# Patient Record
Sex: MALE | Race: WHITE | URBAN - METROPOLITAN AREA
[De-identification: names, ages, dates, MRNs, and addresses within clinical notes are randomized per-mention and may not be internally consistent; named-entity substitution may affect disease eponyms.]

---

## 2022-04-11 ENCOUNTER — OFFICE VISIT (OUTPATIENT)
Dept: URBAN - METROPOLITAN AREA CLINIC 80 | Facility: CLINIC | Age: 57
End: 2022-04-11
Payer: COMMERCIAL

## 2022-04-11 ENCOUNTER — LAB OUTSIDE AN ENCOUNTER (OUTPATIENT)
Dept: URBAN - METROPOLITAN AREA CLINIC 80 | Facility: CLINIC | Age: 57
End: 2022-04-11

## 2022-04-11 ENCOUNTER — WEB ENCOUNTER (OUTPATIENT)
Dept: URBAN - METROPOLITAN AREA CLINIC 80 | Facility: CLINIC | Age: 57
End: 2022-04-11

## 2022-04-11 ENCOUNTER — DASHBOARD ENCOUNTERS (OUTPATIENT)
Age: 57
End: 2022-04-11

## 2022-04-11 DIAGNOSIS — R74.8 ABNORMAL LIVER ENZYMES: ICD-10-CM

## 2022-04-11 DIAGNOSIS — Z12.11 COLON CANCER SCREENING: ICD-10-CM

## 2022-04-11 DIAGNOSIS — R19.4 CHANGE IN BOWEL HABITS: ICD-10-CM

## 2022-04-11 PROCEDURE — 99204 OFFICE O/P NEW MOD 45 MIN: CPT | Performed by: INTERNAL MEDICINE

## 2022-04-11 NOTE — PHYSICAL EXAM CONSTITUTIONAL:
well developed, well nourished , in no acute distress , ambulating without difficulty , normal communication ability
30 years ago

## 2022-04-11 NOTE — HPI-TODAY'S VISIT:
He comes in today accompanied by his wife to discuss recent abnormal labs.  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 He had no risk factors or family history of liver disease.  He did have COVID in December and was taking a lot of tylenol at that point due to body pain.  He has noted mild changes in his stool since he had COVID.  He has never had a colonoscopy.

## 2022-04-14 LAB
A/G RATIO: 2.1
ALBUMIN: 4.4
ALKALINE PHOSPHATASE: 98
ALT (SGPT): 131
ANTI-NUCLEAR AB BY IFA (RDL): POSITIVE
AST (SGOT): 64
BASO (ABSOLUTE): 0.1
BASOS: 1
BILIRUBIN, TOTAL: 0.3
BUN/CREATININE RATIO: 10
BUN: 12
CALCIUM: 9.5
CARBON DIOXIDE, TOTAL: 18
CENTRIOLE PATTERN: (no result)
CENTROMERE PATTERN: (no result)
CHLORIDE: 105
CREATININE: 1.22
EGFR: 70
EOS (ABSOLUTE): 0.1
EOS: 3
FERRITIN, SERUM: 274
GLOBULIN, TOTAL: 2.1
GLUCOSE: 116
HBSAG SCREEN: NEGATIVE
HCV AB: <0.1
HEMATOCRIT: 50.2
HEMATOLOGY COMMENTS:: (no result)
HEMOGLOBIN: 16.1
HEP A AB, IGM: NEGATIVE
HEP B CORE AB, IGM: NEGATIVE
HOMOGENEOUS PATTERN: (no result)
IMMATURE CELLS: (no result)
IMMATURE GRANS (ABS): 0
IMMATURE GRANULOCYTES: 0
INTERPRETATION:: (no result)
LYMPHS (ABSOLUTE): 1.6
LYMPHS: 30
Lab: (no result)
MCH: 28.1
MCHC: 32.1
MCV: 88
MIDBODY PATTERN: (no result)
MITOCHONDRIAL (M2) ANTIBODY: <20
MONOCYTES(ABSOLUTE): 0.5
MONOCYTES: 10
NEUTROPHILS (ABSOLUTE): 3
NEUTROPHILS: 56
NRBC: (no result)
NUCLEAR DOT PATTERN: (no result)
NUCLEAR MEMBRANE PATTERN: (no result)
NUCLEOLAR PATTERN: (no result)
PCNA PATTERN: (no result)
PLATELETS: 268
POTASSIUM: 3.9
PROTEIN, TOTAL: 6.5
RBC: 5.72
RDW: 13.3
SODIUM: 139
SPECKLED PATTERN: (no result)
SPINDLE APPARATUS PATTERN: (no result)
WBC: 5.2

## 2022-05-25 PROBLEM — 305058001: Status: ACTIVE | Noted: 2022-05-25

## 2022-06-14 ENCOUNTER — OFFICE VISIT (OUTPATIENT)
Dept: URBAN - METROPOLITAN AREA SURGERY CENTER 19 | Facility: SURGERY CENTER | Age: 57
End: 2022-06-14
Payer: COMMERCIAL

## 2022-06-14 ENCOUNTER — CLAIMS CREATED FROM THE CLAIM WINDOW (OUTPATIENT)
Dept: URBAN - METROPOLITAN AREA CLINIC 4 | Facility: CLINIC | Age: 57
End: 2022-06-14
Payer: COMMERCIAL

## 2022-06-14 DIAGNOSIS — Z12.11 COLON CANCER SCREENING: ICD-10-CM

## 2022-06-14 DIAGNOSIS — D12.5 BENIGN NEOPLASM OF SIGMOID COLON: ICD-10-CM

## 2022-06-14 DIAGNOSIS — D12.5 ADENOMA OF SIGMOID COLON: ICD-10-CM

## 2022-06-14 PROCEDURE — G8907 PT DOC NO EVENTS ON DISCHARG: HCPCS | Performed by: INTERNAL MEDICINE

## 2022-06-14 PROCEDURE — 88305 TISSUE EXAM BY PATHOLOGIST: CPT | Performed by: PATHOLOGY

## 2022-06-14 PROCEDURE — 45385 COLONOSCOPY W/LESION REMOVAL: CPT | Performed by: INTERNAL MEDICINE
